# Patient Record
Sex: MALE | Race: WHITE | NOT HISPANIC OR LATINO | URBAN - METROPOLITAN AREA
[De-identification: names, ages, dates, MRNs, and addresses within clinical notes are randomized per-mention and may not be internally consistent; named-entity substitution may affect disease eponyms.]

---

## 2017-08-01 ENCOUNTER — OUTPATIENT (OUTPATIENT)
Dept: OUTPATIENT SERVICES | Facility: HOSPITAL | Age: 57
LOS: 1 days | End: 2017-08-01
Payer: COMMERCIAL

## 2017-08-01 PROCEDURE — 76536 US EXAM OF HEAD AND NECK: CPT | Mod: 26

## 2017-11-20 ENCOUNTER — OUTPATIENT (OUTPATIENT)
Dept: OUTPATIENT SERVICES | Facility: HOSPITAL | Age: 57
LOS: 1 days | End: 2017-11-20
Payer: COMMERCIAL

## 2017-11-20 PROCEDURE — 73221 MRI JOINT UPR EXTREM W/O DYE: CPT | Mod: 26,LT

## 2021-07-20 ENCOUNTER — EMERGENCY (EMERGENCY)
Facility: HOSPITAL | Age: 61
LOS: 1 days | End: 2021-07-20
Admitting: EMERGENCY MEDICINE
Payer: COMMERCIAL

## 2021-07-20 PROCEDURE — 99283 EMERGENCY DEPT VISIT LOW MDM: CPT

## 2021-10-06 ENCOUNTER — APPOINTMENT (OUTPATIENT)
Dept: ULTRASOUND IMAGING | Facility: CLINIC | Age: 61
End: 2021-10-06
Payer: COMMERCIAL

## 2021-10-06 ENCOUNTER — TRANSCRIPTION ENCOUNTER (OUTPATIENT)
Age: 61
End: 2021-10-06

## 2021-10-08 PROCEDURE — 76856 US EXAM PELVIC COMPLETE: CPT

## 2021-10-08 PROCEDURE — 76870 US EXAM SCROTUM: CPT

## 2021-10-13 ENCOUNTER — NON-APPOINTMENT (OUTPATIENT)
Age: 61
End: 2021-10-13

## 2021-10-14 ENCOUNTER — NON-APPOINTMENT (OUTPATIENT)
Age: 61
End: 2021-10-14

## 2021-10-14 ENCOUNTER — APPOINTMENT (OUTPATIENT)
Dept: UROLOGY | Facility: CLINIC | Age: 61
End: 2021-10-14
Payer: COMMERCIAL

## 2021-10-14 VITALS
SYSTOLIC BLOOD PRESSURE: 151 MMHG | DIASTOLIC BLOOD PRESSURE: 98 MMHG | HEIGHT: 67 IN | WEIGHT: 183 LBS | TEMPERATURE: 97.4 F | HEART RATE: 71 BPM | BODY MASS INDEX: 28.72 KG/M2

## 2021-10-14 DIAGNOSIS — I86.1 SCROTAL VARICES: ICD-10-CM

## 2021-10-14 DIAGNOSIS — Z84.1 FAMILY HISTORY OF DISORDERS OF KIDNEY AND URETER: ICD-10-CM

## 2021-10-14 DIAGNOSIS — R97.20 ELEVATED PROSTATE, SPECIFIC ANTIGEN [PSA]: ICD-10-CM

## 2021-10-14 DIAGNOSIS — R33.9 RETENTION OF URINE, UNSPECIFIED: ICD-10-CM

## 2021-10-14 DIAGNOSIS — N50.3 CYST OF EPIDIDYMIS: ICD-10-CM

## 2021-10-14 PROCEDURE — 99204 OFFICE O/P NEW MOD 45 MIN: CPT

## 2021-10-14 PROCEDURE — 51798 US URINE CAPACITY MEASURE: CPT

## 2021-10-14 NOTE — PHYSICAL EXAM
[General Appearance - Well Developed] : well developed [General Appearance - Well Nourished] : well nourished [Normal Appearance] : normal appearance [Well Groomed] : well groomed [General Appearance - In No Acute Distress] : no acute distress [Edema] : no peripheral edema [Respiration, Rhythm And Depth] : normal respiratory rhythm and effort [Exaggerated Use Of Accessory Muscles For Inspiration] : no accessory muscle use [Abdomen Soft] : soft [Abdomen Tenderness] : non-tender [Costovertebral Angle Tenderness] : no ~M costovertebral angle tenderness [Urethral Meatus] : meatus normal [Penis Abnormality] : normal circumcised penis [Urinary Bladder Findings] : the bladder was normal on palpation [Scrotum] : the scrotum was normal [Epididymis] : the epididymides were normal [Testes Tenderness] : no tenderness of the testes [Testes Mass (___cm)] : there were no testicular masses [No Prostate Nodules] : no prostate nodules [Prostate Tenderness] : the prostate was not tender [Prostate Size ___ gm] : prostate size [unfilled] gm [Normal Station and Gait] : the gait and station were normal for the patient's age [] : no rash [No Focal Deficits] : no focal deficits [Oriented To Time, Place, And Person] : oriented to person, place, and time [Affect] : the affect was normal [Mood] : the mood was normal [Not Anxious] : not anxious

## 2021-10-14 NOTE — HISTORY OF PRESENT ILLNESS
[FreeTextEntry1] : Mr. DONYA WEEKS 61 year old  M  PMH hypothyroidism  and PSH thyroidectomy, hypospadias surgery as a child. Pt come sin sent by PCP bc of a right swollen testicle. Pt also complaining of a decreased flow and feeling of not emptying. Nocturia x1. Some intermittency, denies hesitancy. Erections are good. Pt was prescribed flomax but has not taken it \par \par 10/8/21 US shows a PVR of 154 and a prostate size of 71cc\par 10/8/21 US shows no intratesticular masses, left varicocele, centimeter sized right epididymal head cyst or spermatocele. 6 mm right epididymal tall cystic lesion or nodule. \par \par 5/19/20 PSA 1.9\par 10/4/21 PSA 5.92\par \par PVR 0\par

## 2021-10-14 NOTE — REVIEW OF SYSTEMS
[Wake up at night to urinate  How many times?  ___] : wakes up to urinate [unfilled] times during the night [Slow urine stream] : slow urine stream [Bladder fullness after urinating] : bladder fullness after urinating [Negative] : Heme/Lymph [see HPI] : see HPI

## 2021-10-14 NOTE — LETTER BODY
[Dear  ___] : Dear  [unfilled], [Consult Letter:] : I had the pleasure of evaluating your patient, [unfilled]. [Please see my note below.] : Please see my note below. [Consult Closing:] : Thank you very much for allowing me to participate in the care of this patient.  If you have any questions, please do not hesitate to contact me. [Sincerely,] : Sincerely, [FreeTextEntry3] : Humza Dc, DO\par Genitourinary Medicine\par

## 2021-10-18 LAB
APPEARANCE: CLEAR
BACTERIA UR CULT: NORMAL
BACTERIA: NEGATIVE
BILIRUBIN URINE: NEGATIVE
BLOOD URINE: NEGATIVE
COLOR: YELLOW
GLUCOSE QUALITATIVE U: NEGATIVE
HYALINE CASTS: 0 /LPF
KETONES URINE: NEGATIVE
LEUKOCYTE ESTERASE URINE: NEGATIVE
MICROSCOPIC-UA: NORMAL
NITRITE URINE: NEGATIVE
PH URINE: 6
PROTEIN URINE: NEGATIVE
RED BLOOD CELLS URINE: 2 /HPF
SPECIFIC GRAVITY URINE: 1.02
SQUAMOUS EPITHELIAL CELLS: 0 /HPF
UROBILINOGEN URINE: NORMAL
WHITE BLOOD CELLS URINE: 1 /HPF

## 2021-11-11 ENCOUNTER — APPOINTMENT (OUTPATIENT)
Dept: UROLOGY | Facility: CLINIC | Age: 61
End: 2021-11-11

## 2024-06-04 ENCOUNTER — NON-APPOINTMENT (OUTPATIENT)
Age: 64
End: 2024-06-04

## 2024-06-04 ENCOUNTER — APPOINTMENT (OUTPATIENT)
Dept: OPHTHALMOLOGY | Facility: CLINIC | Age: 64
End: 2024-06-04
Payer: COMMERCIAL

## 2024-06-04 PROCEDURE — 92004 COMPRE OPH EXAM NEW PT 1/>: CPT

## 2024-06-18 ENCOUNTER — APPOINTMENT (OUTPATIENT)
Dept: NEUROLOGY | Facility: CLINIC | Age: 64
End: 2024-06-18
Payer: COMMERCIAL

## 2024-06-18 VITALS
SYSTOLIC BLOOD PRESSURE: 160 MMHG | HEART RATE: 63 BPM | HEIGHT: 67 IN | BODY MASS INDEX: 28.72 KG/M2 | DIASTOLIC BLOOD PRESSURE: 79 MMHG | OXYGEN SATURATION: 99 % | WEIGHT: 183 LBS

## 2024-06-18 PROCEDURE — 99204 OFFICE O/P NEW MOD 45 MIN: CPT

## 2024-06-18 RX ORDER — TAMSULOSIN HYDROCHLORIDE 0.4 MG/1
0.4 CAPSULE ORAL
Refills: 0 | Status: ACTIVE | COMMUNITY

## 2024-06-18 RX ORDER — LEVOTHYROXINE SODIUM 100 UG/1
100 TABLET ORAL
Refills: 0 | Status: ACTIVE | COMMUNITY

## 2024-06-18 RX ORDER — ROSUVASTATIN CALCIUM 10 MG/1
10 TABLET, FILM COATED ORAL
Refills: 0 | Status: ACTIVE | COMMUNITY

## 2024-06-18 NOTE — HISTORY OF PRESENT ILLNESS
[FreeTextEntry1] : CC: visual disturbance  63 y/o man hx of Chronic Lyme 10 years chandrika, here for hospital follow up. A week ago, was sitting in bed watching TV, and looked up at the television and his vision was distorted, and looked a an article on his IPAD, the lines were in an X. Went away about 3-5 minutes later. Blurred vision, and perception of right eye being swollen. Did not look swollen from the outside. Vision came back slowly. Did not have a headache at this time. While vision came back, perception of enlarged eye continued for about 1.5-2 hours later. Went to the hospital ([facility]), where they did a CT head with and without contrast. His BP was elevated in the ED to 220 systolic. He has hx of white coat hypertension. Has been normal since. Went to an ophthalmologist and primary care doctor. Had MRI done outpatient. Is now following with neurology for this. Nothing like this has every happened. Does admit to having had COVID , followed by RSV, followed by the flu. Has only been in good health for the past -12 weeks. Does admit to ongoing congestion, which he has been taking Flonase for.  ROS: Does admit to tinnitus since January, states pcp states nothing can be done about this. Believes it is coming from right ear. 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

## 2024-06-18 NOTE — DATA REVIEWED
[de-identified] : 1. Minimal increased T2 signal in the periventricular white matter abutting the frontal horn right lateral ventricle, nonspecific but likely representing minimal microvascular ischemic change. There is no evidence of recent ischemic insult. 2. Polypoid inflammatory change right maxillary sinus. [de-identified] : IMPRESSION: 06/04/24 CT PERFUSION demonstrated: No core infarct or penumbra. If symptoms persist consider follow up head CT or MRI, MRA if no contraindication.  CTA COW: Patent intracranial circulation without flow limiting stenosis  CTA NECK: Patent, ECAs, ICAs, no hemodynamically significant stenosis at ICA origins by NASCET criteria. Bilateral vertebral arteries are patent without flow limiting stenosis.

## 2024-06-18 NOTE — DATA REVIEWED
[de-identified] : 1. Minimal increased T2 signal in the periventricular white matter abutting the frontal horn right lateral ventricle, nonspecific but likely representing minimal microvascular ischemic change. There is no evidence of recent ischemic insult. 2. Polypoid inflammatory change right maxillary sinus. [de-identified] : IMPRESSION: 06/04/24 CT PERFUSION demonstrated: No core infarct or penumbra. If symptoms persist consider follow up head CT or MRI, MRA if no contraindication.  CTA COW: Patent intracranial circulation without flow limiting stenosis  CTA NECK: Patent, ECAs, ICAs, no hemodynamically significant stenosis at ICA origins by NASCET criteria. Bilateral vertebral arteries are patent without flow limiting stenosis.

## 2024-06-18 NOTE — HISTORY OF PRESENT ILLNESS
[FreeTextEntry1] : CC: visual disturbance  63 y/o man hx of Chronic Lyme 10 years chandrika, here for hospital follow up. A week ago, was sitting in bed watching TV, and looked up at the television and his vision was distorted, and looked a an article on his IPAD, the lines were in an X. Went away about 3-5 minutes later. Blurred vision, and perception of right eye being swollen. Did not look swollen from the outside. Vision came back slowly. Did not have a headache at this time. While vision came back, perception of enlarged eye continued for about 1.5-2 hours later. Went to the hospital ([facility]), where they did a CT head with and without contrast. His BP was elevated in the ED to 220 systolic. He has hx of white coat hypertension. Has been normal since. Went to an ophthalmologist and primary care doctor. Had MRI done outpatient. Is now following with neurology for this. Nothing like this has every happened. Does admit to having had COVID , followed by RSV, followed by the flu. Has only been in good health for the past -12 weeks. Does admit to ongoing congestion, which he has been taking Flonase for.  ROS: Does admit to tinnitus since January, states pcp states nothing can be done about this. Believes it is coming from right ear. 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

## 2024-06-18 NOTE — PHYSICAL EXAM
[General Appearance - Alert] : alert [General Appearance - In No Acute Distress] : in no acute distress [Oriented To Time, Place, And Person] : oriented to person, place, and time [Impaired Insight] : insight and judgment were intact [Affect] : the affect was normal [Person] : oriented to person [Place] : oriented to place [Time] : oriented to time [Concentration Intact] : normal concentrating ability [Visual Intact] : visual attention was ~T not ~L decreased [Naming Objects] : no difficulty naming common objects [Repeating Phrases] : no difficulty repeating a phrase [Writing A Sentence] : no difficulty writing a sentence [Fluency] : fluency intact [Comprehension] : comprehension intact [Reading] : reading intact [Past History] : adequate knowledge of personal past history [Cranial Nerves Optic (II)] : visual acuity intact bilaterally,  visual fields full to confrontation, pupils equal round and reactive to light [Cranial Nerves Oculomotor (III)] : extraocular motion intact [Cranial Nerves Trigeminal (V)] : facial sensation intact symmetrically [Cranial Nerves Facial (VII)] : face symmetrical [Cranial Nerves Vestibulocochlear (VIII)] : hearing was intact bilaterally [Cranial Nerves Glossopharyngeal (IX)] : tongue and palate midline [Cranial Nerves Accessory (XI - Cranial And Spinal)] : head turning and shoulder shrug symmetric [Cranial Nerves Hypoglossal (XII)] : there was no tongue deviation with protrusion [Motor Tone] : muscle tone was normal in all four extremities [Motor Strength] : muscle strength was normal in all four extremities [No Muscle Atrophy] : normal bulk in all four extremities [Sensation Tactile Decrease] : light touch was intact [Balance] : balance was intact [Past-pointing] : there was no past-pointing [Tremor] : no tremor present [2+] : Ankle jerk left 2+ [Plantar Reflex Right Only] : normal on the right [Plantar Reflex Left Only] : normal on the left [Sclera] : the sclera and conjunctiva were normal [PERRL With Normal Accommodation] : pupils were equal in size, round, reactive to light, with normal accommodation [Extraocular Movements] : extraocular movements were intact [No APD] : no afferent pupillary defect [No ARMAND] : no internuclear ophthalmoplegia [Full Visual Field] : full visual field [Neck Appearance] : the appearance of the neck was normal [Respiration, Rhythm And Depth] : normal respiratory rhythm and effort [Abnormal Walk] : normal gait [Musculoskeletal - Swelling] : no joint swelling seen [Skin Color & Pigmentation] : normal skin color and pigmentation [] : no rash

## 2024-06-18 NOTE — DISCUSSION/SUMMARY
[FreeTextEntry1] : 65 y/o man hx of Chronic Lyme now resolved, COVID , former smoker, presenting with sudden onset visual disturbance a week ago along with abnormal sensation on the right side of face lasting in total 2 hours.  Blood pressure was significantly elevated in the emergency room but that has been normal since then.  Visual disturbance described as lines overlapping and blurred vision.  Unclear if binocular or monocular.  Imaging with CT angio head and neck, MRI brain unremarkable.  He was subsequently seen by ophthalmology and examination was normal.  No further events.  Unclear etiology of transient visual disturbance.  Possible muscle spasm versus TIA but this is doubtful. Smoking hx of is his only stroke risk factor.     Workup TTE to complete cardiac workup.   Counseled patient and wife on stroke symptoms to look out for.  Instructed to return to the emergency room if there were any such symptoms.    Counseled patient on the importance of maintaining a healthy lifestyle, including balanced diet, adequate hydration, stress management, proper sleep hygiene, and physical activity.  Answered all questions and concerns to the best of my ability. Advised to call for any new or worsening symptoms.      In order to maintain continuity of care/prescription refills, patients must be seen on a yearly basis.   Total time spent on the day of the visit, including pre-visit and post-visit time was 50 minutes.

## 2024-06-24 ENCOUNTER — NON-APPOINTMENT (OUTPATIENT)
Age: 64
End: 2024-06-24

## 2024-06-27 ENCOUNTER — APPOINTMENT (OUTPATIENT)
Dept: CARDIOLOGY | Facility: CLINIC | Age: 64
End: 2024-06-27
Payer: COMMERCIAL

## 2024-06-27 ENCOUNTER — NON-APPOINTMENT (OUTPATIENT)
Age: 64
End: 2024-06-27

## 2024-06-27 VITALS — SYSTOLIC BLOOD PRESSURE: 142 MMHG | DIASTOLIC BLOOD PRESSURE: 78 MMHG

## 2024-06-27 VITALS
DIASTOLIC BLOOD PRESSURE: 68 MMHG | SYSTOLIC BLOOD PRESSURE: 138 MMHG | OXYGEN SATURATION: 98 % | HEART RATE: 75 BPM | WEIGHT: 173 LBS | BODY MASS INDEX: 27.15 KG/M2 | HEIGHT: 67 IN

## 2024-06-27 DIAGNOSIS — H53.9 UNSPECIFIED VISUAL DISTURBANCE: ICD-10-CM

## 2024-06-27 DIAGNOSIS — R29.90 UNSPECIFIED SYMPTOMS AND SIGNS INVOLVING THE NERVOUS SYSTEM: ICD-10-CM

## 2024-06-27 PROCEDURE — 93000 ELECTROCARDIOGRAM COMPLETE: CPT

## 2024-06-27 PROCEDURE — 99204 OFFICE O/P NEW MOD 45 MIN: CPT

## 2024-06-27 PROCEDURE — G2211 COMPLEX E/M VISIT ADD ON: CPT | Mod: NC,1L

## 2024-07-26 ENCOUNTER — APPOINTMENT (OUTPATIENT)
Dept: CARDIOLOGY | Facility: CLINIC | Age: 64
End: 2024-07-26
Payer: COMMERCIAL

## 2024-07-26 PROCEDURE — 96374 THER/PROPH/DIAG INJ IV PUSH: CPT | Mod: 59

## 2024-07-26 PROCEDURE — 93306 TTE W/DOPPLER COMPLETE: CPT

## 2024-07-30 ENCOUNTER — APPOINTMENT (OUTPATIENT)
Dept: OPHTHALMOLOGY | Facility: CLINIC | Age: 64
End: 2024-07-30
Payer: SELF-PAY

## 2024-07-30 ENCOUNTER — APPOINTMENT (OUTPATIENT)
Dept: OPHTHALMOLOGY | Facility: CLINIC | Age: 64
End: 2024-07-30
Payer: COMMERCIAL

## 2024-07-30 ENCOUNTER — NON-APPOINTMENT (OUTPATIENT)
Age: 64
End: 2024-07-30

## 2024-07-30 PROCEDURE — 92015 DETERMINE REFRACTIVE STATE: CPT

## 2024-07-30 PROCEDURE — 92060 SENSORIMOTOR EXAMINATION: CPT

## 2024-08-02 ENCOUNTER — APPOINTMENT (OUTPATIENT)
Dept: CARDIOLOGY | Facility: CLINIC | Age: 64
End: 2024-08-02

## 2024-08-08 ENCOUNTER — APPOINTMENT (OUTPATIENT)
Dept: CARDIOLOGY | Facility: CLINIC | Age: 64
End: 2024-08-08

## 2024-08-08 PROCEDURE — 99214 OFFICE O/P EST MOD 30 MIN: CPT

## 2024-08-08 PROCEDURE — G2211 COMPLEX E/M VISIT ADD ON: CPT | Mod: NC

## 2024-08-08 NOTE — DISCUSSION/SUMMARY
[FreeTextEntry1] :  Healthy 64 year old w prior TIA. No evidence of PFO on TTE w/ bubble study. Ongoing A1c / lipid panel control and optimization with PCP. Follow-up with cardiology PRN.   Appreciate the opportunity to participate in the care of Mr. WEEKS. Strict ER precautions were provided to patient for symptoms that arise or worsen. Please do not hesitate to reach out with any questions, concerns, or changes in clinical status.   Francois Marlow MD, [facility]  Interventional & Clinical Cardiology

## 2025-08-05 ENCOUNTER — APPOINTMENT (OUTPATIENT)
Dept: ULTRASOUND IMAGING | Facility: CLINIC | Age: 65
End: 2025-08-05
Payer: COMMERCIAL

## 2025-08-05 PROCEDURE — 76700 US EXAM ABDOM COMPLETE: CPT

## 2025-09-02 ENCOUNTER — APPOINTMENT (OUTPATIENT)
Dept: CT IMAGING | Facility: CLINIC | Age: 65
End: 2025-09-02
Payer: COMMERCIAL

## 2025-09-02 PROCEDURE — 74177 CT ABD & PELVIS W/CONTRAST: CPT
